# Patient Record
Sex: MALE | Race: WHITE | Employment: OTHER | URBAN - METROPOLITAN AREA
[De-identification: names, ages, dates, MRNs, and addresses within clinical notes are randomized per-mention and may not be internally consistent; named-entity substitution may affect disease eponyms.]

---

## 2018-05-03 ENCOUNTER — HOSPITAL ENCOUNTER (OUTPATIENT)
Age: 63
Discharge: HOME | End: 2018-05-05
Payer: COMMERCIAL

## 2018-05-03 PROCEDURE — 88305 TISSUE EXAM BY PATHOLOGIST: CPT

## 2018-06-19 ENCOUNTER — HOSPITAL ENCOUNTER (OUTPATIENT)
Dept: CARDIOLOGY | Age: 63
Discharge: HOME | End: 2018-06-19
Admitting: SURGERY
Payer: COMMERCIAL

## 2018-06-19 ENCOUNTER — OFFICE VISIT (OUTPATIENT)
Dept: VASCULAR SURGERY | Age: 63
End: 2018-06-19
Payer: COMMERCIAL

## 2018-06-19 DIAGNOSIS — I65.23 BILATERAL CAROTID ARTERY STENOSIS: Primary | ICD-10-CM

## 2018-06-19 PROCEDURE — 93880 EXTRACRANIAL BILAT STUDY: CPT

## 2018-06-19 PROCEDURE — G8427 DOCREV CUR MEDS BY ELIG CLIN: HCPCS | Performed by: SURGERY

## 2018-06-19 PROCEDURE — 3017F COLORECTAL CA SCREEN DOC REV: CPT | Performed by: SURGERY

## 2018-06-19 PROCEDURE — G8421 BMI NOT CALCULATED: HCPCS | Performed by: SURGERY

## 2018-06-19 PROCEDURE — 1036F TOBACCO NON-USER: CPT | Performed by: SURGERY

## 2018-06-19 PROCEDURE — 99213 OFFICE O/P EST LOW 20 MIN: CPT | Performed by: SURGERY

## 2018-06-19 PROCEDURE — G8599 NO ASA/ANTIPLAT THER USE RNG: HCPCS | Performed by: SURGERY

## 2020-07-21 ENCOUNTER — HOSPITAL ENCOUNTER (OUTPATIENT)
Dept: CARDIOLOGY | Age: 65
Discharge: HOME | End: 2020-07-21
Payer: MEDICARE

## 2020-07-21 ENCOUNTER — OFFICE VISIT (OUTPATIENT)
Dept: VASCULAR SURGERY | Age: 65
End: 2020-07-21
Payer: MEDICARE

## 2020-07-21 VITALS — WEIGHT: 295 LBS | HEIGHT: 76 IN | RESPIRATION RATE: 16 BRPM | BODY MASS INDEX: 35.92 KG/M2

## 2020-07-21 PROCEDURE — G8417 CALC BMI ABV UP PARAM F/U: HCPCS | Performed by: SURGERY

## 2020-07-21 PROCEDURE — 93880 EXTRACRANIAL BILAT STUDY: CPT

## 2020-07-21 PROCEDURE — G8427 DOCREV CUR MEDS BY ELIG CLIN: HCPCS | Performed by: SURGERY

## 2020-07-21 PROCEDURE — 4040F PNEUMOC VAC/ADMIN/RCVD: CPT | Performed by: SURGERY

## 2020-07-21 PROCEDURE — 1123F ACP DISCUSS/DSCN MKR DOCD: CPT | Performed by: SURGERY

## 2020-07-21 PROCEDURE — 3017F COLORECTAL CA SCREEN DOC REV: CPT | Performed by: SURGERY

## 2020-07-21 PROCEDURE — 99213 OFFICE O/P EST LOW 20 MIN: CPT | Performed by: SURGERY

## 2020-07-21 PROCEDURE — 1036F TOBACCO NON-USER: CPT | Performed by: SURGERY

## 2020-07-21 NOTE — PROGRESS NOTES
[facility] Heart & Vascular Lab - [facility]    This is a pre read worksheet - prior to official physician interpretation    Lizzettejoo Solorzano  1955  Date of study: 7/21/20    Indication for study:  Carotid artery stenosis  Study : Bilateral Carotid Artery Duplex Examination    Duplex examination of the RIGHT carotid artery system identifies atherosclerotic plaque. The peak systolic velocity in internal carotid artery was 133 centimeters / second. The maximum end diastolic velocity was 40 centimeters / second. The ICA/CCA ratio is 1.0. The right vertebral artery has antegrade flow. Duplex examination of the LEFT carotid artery system identifies atherosclerotic plaque. The peak systolic velocity in internal carotid artery was 92 centimeters / second. The maximum end diastolic velocity was 27 centimeters / second. The ICA/CCA ratio is 0.55. The left vertebral artery flow is not visualized.         LAST STUDY  6/19/2018  Rt 50-69  Lt 0-40

## 2020-07-21 NOTE — PROGRESS NOTES
Vascular Surgery Outpatient Progress Note      Chief Complaint   Patient presents with    Follow-up     bilateral carotid artery stenosis       HISTORY OF PRESENT ILLNESS:                The patient is a 76 y.o. male who returns for follow-up evaluation of carotid artery disease. He denies any symptoms of stroke, ministroke, or amaurosis fugax. He states that he has been golfing every day and has retired. Past Medical History:        Diagnosis Date    Bladder problem     cyst on bladder    Cancer [facility])     bladder    Carotid artery stenosis     DVT (deep vein thrombosis) in pregnancy     Hyperlipidemia     Hypertension     Polyp of colon      Past Surgical History:        Procedure Laterality Date    CATARACT REMOVAL      COLONOSCOPY      CYST REMOVAL      VEIN SURGERY       Current Medications:   Prior to Admission medications    Medication Sig Start Date End Date Taking? Authorizing Provider   atorvastatin (LIPITOR) 10 MG tablet Take 10 mg by mouth three times a week  12/3/15  Yes Historical Provider, MD   lisinopril (PRINIVIL;ZESTRIL) 5 MG tablet Take 5 mg by mouth daily 10/27/15  Yes Historical Provider, MD   Coenzyme Q10 (CO Q 10 PO) Take by mouth   Yes Historical Provider, MD   Fish Oil OIL by Does not apply route   Yes Historical Provider, MD   Multiple Vitamins-Minerals (THERAPEUTIC MULTIVITAMIN-MINERALS) tablet Take 1 tablet by mouth daily   Yes Historical Provider, MD   Cholecalciferol (VITAMIN D3) 2000 UNITS CAPS Take by mouth   Yes Historical Provider, MD     Allergies:  Patient has no known allergies.     Social History     Socioeconomic History    Marital status:      Spouse name: Not on file    Number of children: Not on file    Years of education: Not on file    Highest education level: Not on file   Occupational History    Not on file   Social Needs    Financial resource strain: Not on file    Food insecurity     Worry: Not on file     Inability: Not on file   [facility] Transportation needs     Medical: Not on file     Non-medical: Not on file   Tobacco Use    Smoking status: Former Smoker     Packs/day: 1.00     Last attempt to quit: 12/15/2000     Years since quittin.6    Smokeless tobacco: Never Used   Substance and Sexual Activity    Alcohol use: Yes     Comment: social    Drug use: No    Sexual activity: Not on file   Lifestyle    Physical activity     Days per week: Not on file     Minutes per session: Not on file    Stress: Not on file   Relationships    Social connections     Talks on phone: Not on file     Gets together: Not on file     Attends Congregational service: Not on file     Active member of club or organization: Not on file     Attends meetings of clubs or organizations: Not on file     Relationship status: Not on file    Intimate partner violence     Fear of current or ex partner: Not on file     Emotionally abused: Not on file     Physically abused: Not on file     Forced sexual activity: Not on file   Other Topics Concern    Not on file   Social History Narrative    Not on file        Family History   Problem Relation Age of Onset    Cancer Mother     Other Father         cirhrosis       REVIEW OF SYSTEMS (New symptoms):    Eyes:      Blurred vision:  No [x]/Yes []               Diplopia:   No [x]/Yes []               Vision loss:       No [x]/Yes []   Ears, nose, throat:             Hearing loss:    No [x]/Yes []      Vertigo:   No [x]/Yes []                       Swallowing problem:  No [x]/Yes []               Nose bleeds:   No [x]/Yes []      Voice hoarseness:  No [x]/Yes []  Respiratory:             Cough:   No [x]/Yes []      Pleuritic chest pain:  No [x]/Yes []                        Dyspnea:   No [x]/Yes []      Wheezing:   No [x]/Yes []  Cardiovascular:             Angina:   No [x]/Yes []      Palpitations:   No [x]/Yes []          Claudication:    No [x]/Yes []      Leg swelling:   No [x]/Yes []  Gastrointestinal:             Nausea or vomiting:  No [x]/Yes []               Abdominal pain:  No [x]/Yes []                     Intestinal bleeding: No [x]/Yes []  Musculoskeletal:             Leg pain:   No [x]/Yes []      Back pain:   No [x]/Yes []                    Weakness:   No [x]/Yes []  Neurologic:             Numbness:   No [x]/Yes []      Paralysis:   No [x]/Yes []                       Headaches:   No [x]/Yes []  Hematologic, lymphatic:   Anemia:   No [x]/Yes []              Bleeding or bruising:  No [x]/Yes []              Fevers or chills: No [x]/Yes []  Endocrine:             Temp intolerance:   No [x]/Yes []                       Polydipsia, polyuria:  No [x]/Yes []  Skin:              Rash:    No [x]/Yes []      Ulcers:   No [x]/Yes []              Abnorm pigment: No [x]/Yes []  :              Frequency/urgency:  No [x]/Yes []      Hematuria:    No [x]/Yes []                      Incontinence:    No [x]/Yes []    PHYSICAL EXAM:  Vitals:    07/21/20 1002   Resp: 16     General Appearance: alert and oriented to person, place and time, in no acute distress, well developed and well- nourished  Neurologic: no cranial nerve deficit, speech normal  Head: normocephalic and atraumatic  Eyes: extraocular eye movements intact, conjunctivae normal  ENT: external ear and ear canal normal bilaterally, nose without deformity,   Pulmonary/Chest: normal air movement, no respiratory distress, no carotid bruits  Cardiovascular: normal rate, regular rhythm, no murmur  Abdomen: non-distended, no masses  Musculoskeletal: no joint deformity or tenderness  Extremities: no leg edema bilaterally  Skin: warm and dry, no rash or erythema    PULSE EXAM      Right      Left   Brachial     Radial     Femoral     Popliteal     Dorsalis Pedis 2 2   Posterior Tibial     (3=normal, 2=diminished, 1=barely palpable, 4=widened)    RADIOLOGY: May Louis  1955  Date of study: 7/21/20     Indication for study:  Carotid artery stenosis  Study : Bilateral Carotid Artery Duplex Examination     Duplex examination of the RIGHT carotid artery system identifies atherosclerotic plaque. The peak systolic velocity in internal carotid artery was 133 centimeters / second. The maximum end diastolic velocity was 40 centimeters / second. The ICA/CCA ratio is 1.0. The right vertebral artery has antegrade flow.     Duplex examination of the LEFT carotid artery system identifies atherosclerotic plaque. The peak systolic velocity in internal carotid artery was 92 centimeters / second. The maximum end diastolic velocity was 27 centimeters / second. The ICA/CCA ratio is 0.55. The left vertebral artery flow is not visualized.           LAST STUDY  6/19/2018  Rt 50-69  Lt 0-40    Problem List Items Addressed This Visit     Bilateral carotid artery stenosis - Primary    Relevant Orders    VL DUP CAROTID BILATERAL          I reviewed with the patient that from my standpoint he can continue with all normal activities. I will plan to see him again in two year but asked him to call sooner with any new problems. I reviewed the natural history and treatment options of carotid artery disease. I reviewed the signs and symptoms of stroke, and instructions if symptoms occur. Return in about 2 years (around 7/21/2022) for testing.

## 2020-08-11 LAB — PROSTATE SPECIFIC ANTIGEN: 0.45 NG/ML

## 2022-07-18 ENCOUNTER — TELEPHONE (OUTPATIENT)
Dept: VASCULAR SURGERY | Age: 67
End: 2022-07-18

## 2022-07-19 ENCOUNTER — HOSPITAL ENCOUNTER (OUTPATIENT)
Dept: CARDIOLOGY | Age: 67
Discharge: HOME | End: 2022-07-19
Payer: MEDICARE

## 2022-07-19 ENCOUNTER — OFFICE VISIT (OUTPATIENT)
Dept: VASCULAR SURGERY | Age: 67
End: 2022-07-19
Payer: MEDICARE

## 2022-07-19 VITALS — BODY MASS INDEX: 35.92 KG/M2 | HEIGHT: 76 IN | WEIGHT: 295 LBS

## 2022-07-19 DIAGNOSIS — I65.23 BILATERAL CAROTID ARTERY STENOSIS: ICD-10-CM

## 2022-07-19 DIAGNOSIS — I65.23 BILATERAL CAROTID ARTERY STENOSIS: Primary | ICD-10-CM

## 2022-07-19 PROCEDURE — 1036F TOBACCO NON-USER: CPT | Performed by: SURGERY

## 2022-07-19 PROCEDURE — G8417 CALC BMI ABV UP PARAM F/U: HCPCS | Performed by: SURGERY

## 2022-07-19 PROCEDURE — 99212 OFFICE O/P EST SF 10 MIN: CPT | Performed by: NURSE PRACTITIONER

## 2022-07-19 PROCEDURE — 93880 EXTRACRANIAL BILAT STUDY: CPT

## 2022-07-19 PROCEDURE — 1123F ACP DISCUSS/DSCN MKR DOCD: CPT | Performed by: NURSE PRACTITIONER

## 2022-07-19 PROCEDURE — 3017F COLORECTAL CA SCREEN DOC REV: CPT | Performed by: SURGERY

## 2022-07-19 PROCEDURE — G8427 DOCREV CUR MEDS BY ELIG CLIN: HCPCS | Performed by: SURGERY

## 2022-07-19 NOTE — PROGRESS NOTES
[facility] Heart & Vascular Lab - [facility]    This is a pre read worksheet - prior to official physician interpretation    Malia Segovia  1955  Date of study: 7/19/22    Indication for study:  Carotid artery stenosis  Study : Bilateral Carotid Artery Duplex Examination    Duplex examination of the RIGHT carotid artery system identifies atherosclerotic plaque. The peak systolic velocity in internal carotid artery was 129 centimeters / second. The maximum end diastolic velocity was 35 centimeters / second. The ICA/CCA ratio is 1.0. The right vertebral artery has antegrade flow. Duplex examination of the LEFT carotid artery system identifies atherosclerotic plaque. The peak systolic velocity in internal carotid artery was 130 centimeters / second. The maximum end diastolic velocity was 42 centimeters / second. The ICA/CCA ratio is 0.72. The left vertebral artery has antegrade flow.       LEFT /24      LAST STUDY  7/21/2020  Rt 50  Lt  CCA 50

## 2022-07-19 NOTE — PROGRESS NOTES
Vascular Surgery Outpatient Progress Note      Chief Complaint   Patient presents with    Circulatory Problem     Bilateral carotid artery stenosis       HISTORY OF PRESENT ILLNESS:                The patient is a 75 y.o. male who returns for follow-up evaluation of carotid artery disease. He denies any symptoms of stroke, ministroke, or amaurosis fugax. He denies any recent hospital admission, major illness, or surgery since the last office visit. Past Medical History:        Diagnosis Date    Bladder problem     cyst on bladder    Cancer ([facility] [address]     bladder    Carotid artery stenosis     DVT (deep vein thrombosis) in pregnancy     Hyperlipidemia     Hypertension     Polyp of colon      Past Surgical History:        Procedure Laterality Date    CATARACT REMOVAL      COLONOSCOPY      CYST REMOVAL      VEIN SURGERY       Current Medications:   Prior to Admission medications    Medication Sig Start Date End Date Taking? Authorizing Provider   atorvastatin (LIPITOR) 10 MG tablet Take 10 mg by mouth three times a week  12/3/15  Yes Historical Provider, MD   lisinopril (PRINIVIL;ZESTRIL) 5 MG tablet Take 5 mg by mouth daily 10/27/15  Yes Historical Provider, MD   Coenzyme Q10 (CO Q 10 PO) Take by mouth   Yes Historical Provider, MD   Fish Oil OIL by Does not apply route   Yes Historical Provider, MD   Multiple Vitamins-Minerals (THERAPEUTIC MULTIVITAMIN-MINERALS) tablet Take 1 tablet by mouth daily   Yes Historical Provider, MD   Cholecalciferol (VITAMIN D3) 2000 UNITS CAPS Take by mouth   Yes Historical Provider, MD     Allergies:  Patient has no known allergies.     Social History     Socioeconomic History    Marital status:      Spouse name: Not on file    Number of children: Not on file    Years of education: Not on file    Highest education level: Not on file   Occupational History    Not on file   Tobacco Use    Smoking status: Former     Packs/day: 1.00     Types: Cigarettes     Quit date: 12/15/2000 Years since quittin.6    Smokeless tobacco: Never   Vaping Use    Vaping Use: Never used   Substance and Sexual Activity    Alcohol use: Yes     Comment: social    Drug use: No    Sexual activity: Not on file   Other Topics Concern    Not on file   Social History Narrative    Not on file     Social Determinants of Health     Financial Resource Strain: Not on file   Food Insecurity: Not on file   Transportation Needs: Not on file   Physical Activity: Not on file   Stress: Not on file   Social Connections: Not on file   Intimate Partner Violence: Not on file   Housing Stability: Not on file        Family History   Problem Relation Age of Onset    Cancer Mother     Other Father         cirhrosis       REVIEW OF SYSTEMS (New symptoms):    Eyes:      Blurred vision:  No [x]/Yes []               Diplopia:   No [x]/Yes []               Vision loss:       No [x]/Yes []   Ears, nose, throat:             Hearing loss:    No [x]/Yes []      Vertigo:   No [x]/Yes []                       Swallowing problem:  No [x]/Yes []               Nose bleeds:   No [x]/Yes []      Voice hoarseness:  No [x]/Yes []  Respiratory:             Cough:   No [x]/Yes []      Pleuritic chest pain:  No [x]/Yes []                        Dyspnea:   No [x]/Yes []      Wheezing:   No [x]/Yes []  Cardiovascular:             Angina:   No [x]/Yes []      Palpitations:   No [x]/Yes []          Claudication:    No [x]/Yes []      Leg swelling:   No [x]/Yes []  Gastrointestinal:             Nausea or vomiting:  No [x]/Yes []               Abdominal pain:  No [x]/Yes []                     Intestinal bleeding: No [x]/Yes []  Musculoskeletal:             Leg pain:   No [x]/Yes []      Back pain:   No [x]/Yes []                    Weakness:   No [x]/Yes []  Neurologic:             Numbness:   No [x]/Yes []      Paralysis:   No [x]/Yes []                       Headaches:   No [x]/Yes []  Hematologic, lymphatic:   Anemia:   No [x]/Yes []              Bleeding or bruising:  No [x]/Yes []              Fevers or chills: No [x]/Yes []  Endocrine:             Temp intolerance:   No [x]/Yes []                       Polydipsia, polyuria:  No [x]/Yes []  Skin:              Rash:    No [x]/Yes []      Ulcers:   No [x]/Yes []              Abnorm pigment: No [x]/Yes []  :              Frequency/urgency:  No [x]/Yes []      Hematuria:    No [x]/Yes []                      Incontinence:    No [x]/Yes []    PHYSICAL EXAM:  There were no vitals filed for this visit. General Appearance: alert and oriented to person, place and time, in no acute distress, well developed and well- nourished  Neurologic: no cranial nerve deficit, speech normal  Head: normocephalic and atraumatic  Eyes: extraocular eye movements intact, conjunctivae normal  ENT: external ear and ear canal normal bilaterally, nose without deformity,   Pulmonary/Chest: normal air movement, no respiratory distress, no carotid bruits  Cardiovascular: normal rate, regular rhythm, no murmur  Abdomen: non-distended, no masses  Musculoskeletal: no joint deformity or tenderness  Extremities: no leg edema bilaterally  Skin: warm and dry, no rash or erythema    PULSE EXAM      Right      Left   Brachial     Radial     Femoral     Popliteal     Dorsalis Pedis 2 2   Posterior Tibial     (3=normal, 2=diminished, 1=barely palpable, 4=widened)    RADIOLOGY:   Mendy Gillespie  1955  Date of study: 7/19/22     Indication for study:  Carotid artery stenosis  Study : Bilateral Carotid Artery Duplex Examination     Duplex examination of the RIGHT carotid artery system identifies atherosclerotic plaque. The peak systolic velocity in internal carotid artery was 129 centimeters / second. The maximum end diastolic velocity was 35 centimeters / second. The ICA/CCA ratio is 1.0. The right vertebral artery has antegrade flow. Duplex examination of the LEFT carotid artery system identifies atherosclerotic plaque.   The peak systolic velocity in internal carotid artery was 130 centimeters / second. The maximum end diastolic velocity was 42 centimeters / second. The ICA/CCA ratio is 0.72. The left vertebral artery has antegrade flow. LEFT /24     LAST STUDY  7/21/2020  Rt 48  Lt  CCA 50      Problem List Items Addressed This Visit       Bilateral carotid artery stenosis - Primary       I reviewed with the patient that from my standpoint he can continue with all normal activities. The patient's mild carotid stenosis has remained stable over the past several years. At this point, I recommend he have a repeat carotid ultrasound done in about 4 years. This can be ordered by his PCP. If there are any changes, he can then be seen here again for evaluation. I reviewed the natural history and treatment options of carotid artery disease. I reviewed the signs and symptoms of stroke, and instructions if symptoms occur. Pt seen and plan reviewed with Dr. Aydin Frausto. Angie Hassan, APRN - CNP    No follow-ups on file.